# Patient Record
Sex: MALE | Race: WHITE | NOT HISPANIC OR LATINO | Employment: FULL TIME | URBAN - METROPOLITAN AREA
[De-identification: names, ages, dates, MRNs, and addresses within clinical notes are randomized per-mention and may not be internally consistent; named-entity substitution may affect disease eponyms.]

---

## 2018-10-02 ENCOUNTER — AMBULATORY - HEALTHEAST (OUTPATIENT)
Dept: INTERNAL MEDICINE | Facility: CLINIC | Age: 33
End: 2018-10-02

## 2018-10-02 ENCOUNTER — OFFICE VISIT - HEALTHEAST (OUTPATIENT)
Dept: INTERNAL MEDICINE | Facility: CLINIC | Age: 33
End: 2018-10-02

## 2018-10-02 DIAGNOSIS — F90.0 ATTENTION DEFICIT HYPERACTIVITY DISORDER (ADHD), PREDOMINANTLY INATTENTIVE TYPE: ICD-10-CM

## 2018-10-02 DIAGNOSIS — E66.01 MORBID OBESITY (H): ICD-10-CM

## 2018-10-02 DIAGNOSIS — Z00.00 ENCOUNTER FOR GENERAL MEDICAL EXAMINATION: ICD-10-CM

## 2018-10-02 RX ORDER — GABAPENTIN 300 MG/1
300 CAPSULE ORAL
Status: SHIPPED | COMMUNITY
Start: 2016-08-04

## 2018-10-02 RX ORDER — NORTRIPTYLINE HCL 10 MG
10 CAPSULE ORAL
Status: SHIPPED | COMMUNITY
Start: 2014-05-08

## 2018-10-02 RX ORDER — HYDROMORPHONE HYDROCHLORIDE 4 MG/1
4-6 TABLET ORAL
Status: SHIPPED | COMMUNITY
Start: 2016-08-04

## 2018-10-02 RX ORDER — DEXTROAMPHETAMINE SACCHARATE, AMPHETAMINE ASPARTATE MONOHYDRATE, DEXTROAMPHETAMINE SULFATE AND AMPHETAMINE SULFATE 7.5; 7.5; 7.5; 7.5 MG/1; MG/1; MG/1; MG/1
30 CAPSULE, EXTENDED RELEASE ORAL
Status: SHIPPED | COMMUNITY
Start: 2018-10-02

## 2018-10-02 RX ORDER — DIAZEPAM 5 MG
5 TABLET ORAL
Status: SHIPPED | COMMUNITY
Start: 2018-10-02

## 2018-10-02 RX ORDER — OXYCODONE HCL 40 MG/1
40 TABLET, FILM COATED, EXTENDED RELEASE ORAL
Status: SHIPPED | COMMUNITY
Start: 2016-08-04

## 2018-10-02 RX ORDER — CYCLOBENZAPRINE HCL 5 MG
5-10 TABLET ORAL
Status: SHIPPED | COMMUNITY
Start: 2016-08-04

## 2018-10-02 RX ORDER — ACETAMINOPHEN 500 MG
1000 TABLET ORAL
Status: SHIPPED | COMMUNITY
Start: 2016-08-04

## 2018-10-02 ASSESSMENT — MIFFLIN-ST. JEOR: SCORE: 2689.43

## 2021-05-02 ENCOUNTER — HOSPITAL ENCOUNTER (EMERGENCY)
Dept: EMERGENCY MEDICINE | Facility: HOSPITAL | Age: 36
Discharge: LEFT BEFORE DISCHARGE | End: 2021-05-03

## 2021-05-02 ASSESSMENT — MIFFLIN-ST. JEOR: SCORE: 2677.19

## 2021-05-30 ENCOUNTER — RECORDS - HEALTHEAST (OUTPATIENT)
Dept: ADMINISTRATIVE | Facility: CLINIC | Age: 36
End: 2021-05-30

## 2021-05-31 ENCOUNTER — RECORDS - HEALTHEAST (OUTPATIENT)
Dept: ADMINISTRATIVE | Facility: CLINIC | Age: 36
End: 2021-05-31

## 2021-06-02 ENCOUNTER — RECORDS - HEALTHEAST (OUTPATIENT)
Dept: ADMINISTRATIVE | Facility: CLINIC | Age: 36
End: 2021-06-02

## 2021-06-02 VITALS — BODY MASS INDEX: 39.17 KG/M2 | WEIGHT: 315 LBS | HEIGHT: 75 IN

## 2021-06-03 ENCOUNTER — RECORDS - HEALTHEAST (OUTPATIENT)
Dept: ADMINISTRATIVE | Facility: CLINIC | Age: 36
End: 2021-06-03

## 2021-06-05 VITALS — HEIGHT: 75 IN | BODY MASS INDEX: 39.17 KG/M2 | WEIGHT: 315 LBS

## 2021-06-16 PROBLEM — E66.01 MORBID OBESITY (H): Status: ACTIVE | Noted: 2018-10-02

## 2021-06-17 NOTE — ED TRIAGE NOTES
Pt reports R testicle pain x 2 weeks. Denies other sx. He also reports chronic R knee pain since July 2020. Amb  Without difficulty to triage. Rates knee pain 20/10. Has not been taking anything for pain.

## 2021-06-20 NOTE — PROGRESS NOTES
ASSESSMENT/PLAN:    1. Encounter for general medical examination  General health evaluation today is negative for active issues except the chronic musculoskeletal back and neck pain, and his obesity.     2. Morbid obesity (H)  We discussed a healthy weight target and healthy exercise level.      3. Chronic pain disorder  Ongoing pain clinic treatment.     4. ADD history  Referred to psychology for evaluation    CHIEF COMPLAINT:  Chief Complaint   Patient presents with     Annual Exam     Medication Management     HISTORY OF PRESENT ILLNESS:  Cj is a 33 y.o. male presenting to the clinic today with request for general health evaluation.  He has been well, except for chronic pain disorder and  ADD.  He has had weight gain in the past few years.  Uses medical marijuana and does not smoke.  No recent illness or unusual cough.  Tolerates working with restrictions and manages to be active with his chronic pain. He does not describe radicular pain.     REVIEW OF SYSTEMS:   Constitutional: no fever, chills, or sweats  Respiratory: No wheezes, cough, shortness of breath  Cardiovascular: No chest pain or palpitations  Gastrointestinal: No nausea, vomiting, diarrhea, dyspepsia, or pain  Musculoskeletal: see HPI  All other systems on reveiw are negative.    PFSH:    History   Smoking Status     Never Smoker   Smokeless Tobacco     Never Used     Family History   Problem Relation Age of Onset     Neuropathy Mother      ADD / ADHD Father      Social History     Social History     Marital status: Single     Spouse name: N/A     Number of children: 1     Years of education: N/A     Occupational History     Fork lifting       Social History Main Topics     Smoking status: Never Smoker     Smokeless tobacco: Never Used     Alcohol use Yes      Comment: 2 beers a week     Drug use: No     Sexual activity: Yes     Partners: Female     Birth control/ protection: Condom     Other Topics Concern     Not on file     Social History  "Narrative    Breeds dogs, motorcycles     Past Surgical History:   Procedure Laterality Date     BACK SURGERY      L5-S1 titanium disc     WRIST FRACTURE SURGERY Left      No Known Allergies    Active Ambulatory Problems     Diagnosis Date Noted     Lower Back Pain      Cervicalgia      Closed fracture of radius 08/29/2016     Displacement of lumbar intervertebral disc without myelopathy 01/05/2012     Long term (current) use of opiate analgesic 08/02/2016     Marijuana abuse 08/02/2016     Morbid obesity (H) 10/02/2018     Resolved Ambulatory Problems     Diagnosis Date Noted     No Resolved Ambulatory Problems     Past Medical History:   Diagnosis Date     ADHD      Chronic back pain      Chronic neck pain      Opioid dependence with current use (H)      Wrist fracture      VITALS:  Vitals:    10/02/18 0919   BP: 138/86   Patient Site: Left Arm   Patient Position: Sitting   Cuff Size: Adult Large   Pulse: 62   SpO2: 96%   Weight: (!) 365 lb 11.2 oz (165.9 kg)   Height: 6' 3\" (1.905 m)     Wt Readings from Last 3 Encounters:   10/02/18 (!) 365 lb 11.2 oz (165.9 kg)   10/07/16 (!) 340 lb (154.2 kg)   11/13/14 (!) 320 lb (145.2 kg)     Body mass index is 45.71 kg/(m^2).    PHYSICAL EXAM:  General Appearance: In no acute distress  /86 (Patient Site: Left Arm, Patient Position: Sitting, Cuff Size: Adult Large)  Pulse 62  Ht 6' 3\" (1.905 m)  Wt (!) 365 lb 11.2 oz (165.9 kg)  SpO2 96%  BMI 45.71 kg/m2  EYES: Clear, without inflammation, fundi normal   HEENT: Without congestion or inflammation  NECK:  supple, without adenopathy or thryroid enlargement  RESPIRATORY: Clear to auscultation  CARDIOVASCULAR: S1, S2, without murmur  ABDOMEN: soft, flat, and non-tender, without mass, rebound, or guarding  RECTAL: deferred  GENITOURINARY: deferred  MUSCULOSKELETAL: No joint swelling, or inflammation  PERIPHERAL PULSES:  full, diminished, 1+ bilateral edema, L > R  NEUROLOGIC: Non-focal, no arm or leg  weakness, speech " is clear  PSYCHIATRIC: Oriented X 3, without confusion, behavior and affect normal    Current Outpatient Prescriptions   Medication Sig Dispense Refill     diazePAM (VALIUM) 5 MG tablet Take 5 mg by mouth.       Oxycodone HCl 10 mg Tab Take 20 mg by mouth.        acetaminophen (TYLENOL) 500 MG tablet Take 1,000 mg by mouth.       cyclobenzaprine (FLEXERIL) 5 MG tablet Take 5-10 mg by mouth.       dextroamphetamine-amphetamine (ADDERALL XR) 30 MG 24 hr capsule Take 30 mg by mouth.       gabapentin (NEURONTIN) 300 MG capsule Take 300 mg by mouth.       HYDROmorphone (DILAUDID) 4 MG tablet Take 4-6 mg by mouth.       nortriptyline (PAMELOR) 10 MG capsule Take 10 mg by mouth.       oxyCODONE (OXYCONTIN) 40 mg 12 hr tablet Take 40 mg by mouth.       No current facility-administered medications for this visit.

## 2021-07-25 ENCOUNTER — HOSPITAL ENCOUNTER (EMERGENCY)
Facility: HOSPITAL | Age: 36
Discharge: HOME | End: 2021-07-25
Payer: COMMERCIAL

## 2021-08-10 ENCOUNTER — HOSPITAL ENCOUNTER (OUTPATIENT)
Facility: CLINIC | Age: 36
End: 2021-08-10
Attending: ORTHOPAEDIC SURGERY | Admitting: ORTHOPAEDIC SURGERY

## 2021-08-10 DIAGNOSIS — S83.249A MEDIAL MENISCUS TEAR: ICD-10-CM

## 2021-08-10 RX ORDER — CEFAZOLIN SODIUM IN 0.9 % NACL 3 G/100 ML
3 INTRAVENOUS SOLUTION, PIGGYBACK (ML) INTRAVENOUS SEE ADMIN INSTRUCTIONS
Status: CANCELLED | OUTPATIENT
Start: 2021-08-10

## 2021-08-10 RX ORDER — CEFAZOLIN SODIUM IN 0.9 % NACL 3 G/100 ML
3 INTRAVENOUS SOLUTION, PIGGYBACK (ML) INTRAVENOUS
Status: CANCELLED | OUTPATIENT
Start: 2021-08-10

## 2021-09-08 DIAGNOSIS — M25.561 RIGHT KNEE PAIN, UNSPECIFIED CHRONICITY: Primary | ICD-10-CM

## 2021-10-12 ENCOUNTER — ANESTHESIA EVENT (OUTPATIENT)
Dept: SURGERY | Facility: CLINIC | Age: 36
End: 2021-10-12

## 2021-10-13 ENCOUNTER — ANESTHESIA (OUTPATIENT)
Dept: SURGERY | Facility: CLINIC | Age: 36
End: 2021-10-13